# Patient Record
Sex: FEMALE | Race: WHITE | NOT HISPANIC OR LATINO | Employment: OTHER | URBAN - NONMETROPOLITAN AREA
[De-identification: names, ages, dates, MRNs, and addresses within clinical notes are randomized per-mention and may not be internally consistent; named-entity substitution may affect disease eponyms.]

---

## 2018-06-05 ENCOUNTER — OFFICE VISIT (OUTPATIENT)
Dept: FAMILY MEDICINE | Facility: OTHER | Age: 68
End: 2018-06-05
Attending: NURSE PRACTITIONER
Payer: COMMERCIAL

## 2018-06-05 VITALS — HEART RATE: 60 BPM | SYSTOLIC BLOOD PRESSURE: 122 MMHG | DIASTOLIC BLOOD PRESSURE: 80 MMHG | WEIGHT: 201.3 LBS
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
## 2018-06-05 PROBLEM — A69.20 LYME DISEASE: Status: ACTIVE | Noted: 2018-06-05

## 2018-06-05 PROCEDURE — 99213 OFFICE O/P EST LOW 20 MIN: CPT | Performed by: NURSE PRACTITIONER

## 2018-06-05 PROCEDURE — G0463 HOSPITAL OUTPT CLINIC VISIT: HCPCS

## 2018-06-05 RX ORDER — DOXYCYCLINE 100 MG/1
200 CAPSULE ORAL ONCE
Qty: 2 CAPSULE | Refills: 0 | Status: SHIPPED | OUTPATIENT
Start: 2018-06-05 | End: 2018-06-05

## 2018-06-05 ASSESSMENT — PAIN SCALES - GENERAL: PAINLEVEL: NO PAIN (0)

## 2018-06-05 ASSESSMENT — ANXIETY QUESTIONNAIRES
1. FEELING NERVOUS, ANXIOUS, OR ON EDGE: NOT AT ALL
2. NOT BEING ABLE TO STOP OR CONTROL WORRYING: NOT AT ALL

## 2018-06-05 NOTE — MR AVS SNAPSHOT
"              After Visit Summary   6/5/2018    Claudia Ballesteros    MRN: [id number]           Patient Information     Date Of Birth          1950        Visit Information        Provider Department      6/5/2018 9:30 AM Yanira Ortiz CNP [facility]        Today's Diagnoses     Tick bite, initial encounter    -  1      Care Instructions    ASSESSMENT/PLAN:     1. Tick bite, initial encounter  - doxycycline (VIBRAMYCIN) 100 MG capsule; Take 2 capsules (200 mg) by mouth once for 1 dose  Dispense: 2 capsule; Refill: 0  - Take both pills at the same time with food.   - If you notice any symptoms of Lyme disease as discussed return to the clinic for tick borne disease testing.                 Yanira Ortiz CNP  [facility]            Follow-ups after your visit        Who to contact     If you have questions or need follow up information about today's clinic visit or your schedule please contact [facility] directly at [phone].  Normal or non-critical lab and imaging results will be communicated to you by Scirrahart, letter or phone within 4 business days after the clinic has received the results. If you do not hear from us within 7 days, please contact the clinic through Scirrahart or phone. If you have a critical or abnormal lab result, we will notify you by phone as soon as possible.  Submit refill requests through NewHound or call your pharmacy and they will forward the refill request to us. Please allow 3 business days for your refill to be completed.          Additional Information About Your Visit        Scirrahart Information     NewHound lets you send messages to your doctor, view your test results, renew your prescriptions, schedule appointments and more. To sign up, go to [email or web address] . Click on \"Log in\" on the left side of the screen, which will take you to the Welcome page. Then click on \"Sign up Now\" on the right side of the " page.     You will be asked to enter the access code listed below, as well as some personal information. Please follow the directions to create your username and password.     Your access code is: [email or web address]  Expires: 9/3/2018  9:50 AM     Your access code will  in 90 days. If you need help or a new code, please call your [place] clinic or [phone].        Care EveryWhere ID     This is your Care EveryWhere ID. This could be used by other organizations to access your [place] medical records  [id number]        Your Vitals Were     Pulse                   60            Blood Pressure from Last 3 Encounters:   18 122/80    Weight from Last 3 Encounters:   18 201 lb 4.8 oz (91.3 kg)              Today, you had the following     No orders found for display         Today's Medication Changes          These changes are accurate as of 18  9:50 AM.  If you have any questions, ask your nurse or doctor.               Start taking these medicines.        Dose/Directions    doxycycline 100 MG capsule   Commonly known as:  VIBRAMYCIN   Used for:  Tick bite, initial encounter   Started by:  Yanira Ortiz CNP        Dose:  200 mg   Take 2 capsules (200 mg) by mouth once for 1 dose   Quantity:  2 capsule   Refills:  0            Where to get your medicines      These medications were sent to [facility] [address] [place], [place] - [address] AT [address]   [address], [facility] [address]     Phone:  [phone]     doxycycline 100 MG capsule                Primary Care Provider Fax #    Physician No Ref-Primary [phone]       No address on file        Equal Access to Services     JARED PAULINO : Hadii charo pantojao Sodayana, waaxda luqadaha, qaybta kaalmada marlyn, vignesh huff. So [facility] [phone].    ATENCIÓN: Si habla español, tiene a hernandez disposición servicios gratuitos de asistencia lingüística. Llame al [phone].    We  comply with applicable federal civil rights laws and [place] laws. We do not discriminate on the basis of race, color, national origin, age, disability, sex, sexual orientation, or gender identity.            Thank you!     Thank you for choosing [facility] AND [facility]  for your care. Our goal is always to provide you with excellent care. Hearing back from our patients is one way we can continue to improve our services. Please take a few minutes to complete the written survey that you may receive in the mail after your visit with us. Thank you!             Your Updated Medication List - Protect others around you: Learn how to safely use, store and throw away your medicines at www.disposemymeds.org.          This list is accurate as of 6/5/18  9:50 AM.  Always use your most recent med list.                   Brand Name Dispense Instructions for use Diagnosis    doxycycline 100 MG capsule    VIBRAMYCIN    2 capsule    Take 2 capsules (200 mg) by mouth once for 1 dose    Tick bite, initial encounter

## 2018-06-05 NOTE — PATIENT INSTRUCTIONS
ASSESSMENT/PLAN:     1. Tick bite, initial encounter  - doxycycline (VIBRAMYCIN) 100 MG capsule; Take 2 capsules (200 mg) by mouth once for 1 dose  Dispense: 2 capsule; Refill: 0  - Take both pills at the same time with food.   - If you notice any symptoms of Lyme disease as discussed return to the clinic for tick borne disease testing.                 Yanira Ortiz, [facility] AND [facility]

## 2018-06-05 NOTE — PROGRESS NOTES
SUBJECTIVE:   Claudia Ballesteros is a 67 year old female who presents to clinic today for the following health issues:    Tick Bite  Removed tick during night off of left buttock.  Brought tick in and it is consistent appearance of a female adult deer tick.  Denies symptoms of headache, arthralgia, myalgia, neuropathy, fatigue.  History of lyme disease--tested positive in CSF and had several neurologic symptoms about 5 years ago.  A little redness around tick bite, no other rashes.      Problem list and histories reviewed & adjusted, as indicated.  Additional history: as documented    There is no problem list on file for this patient.    No past surgical history on file.    Social History   Substance Use Topics     Smoking status: Never Smoker     Smokeless tobacco: Never Used     Alcohol use Yes     No family history on file.      Current Outpatient Prescriptions   Medication Sig Dispense Refill     doxycycline (VIBRAMYCIN) 100 MG capsule Take 2 capsules (200 mg) by mouth once for 1 dose 2 capsule 0     Allergies   Allergen Reactions     Naproxen Hives     No lab results found.   BP Readings from Last 3 Encounters:   06/05/18 122/80    Wt Readings from Last 3 Encounters:   06/05/18 201 lb 4.8 oz (91.3 kg)                    Reviewed and updated as needed this visit by clinical staff       Reviewed and updated as needed this visit by Provider         ROS:  Constitutional, HEENT, cardiovascular, pulmonary, gi and gu systems are negative, except as otherwise noted.    OBJECTIVE:     /80 (BP Location: Right arm, Patient Position: Sitting, Cuff Size: Adult Large)  Pulse 60  Wt 201 lb 4.8 oz (91.3 kg)      GENERAL: healthy, alert and no distress  MS: no gross musculoskeletal defects noted, no edema  SKIN: Localized erythema around tick bite on left buttock, no drainage or warmth  NEURO: Normal strength and tone, mentation intact and speech normal  PSYCH: mentation appears normal, affect  normal        ASSESSMENT/PLAN:     1. Tick bite, initial encounter  Discussed limitations of doing tick borne disease testing at this point. Within 72 hour limit for Lyme prophylaxis.   - doxycycline (VIBRAMYCIN) 100 MG capsule; Take 2 capsules (200 mg) by mouth once for 1 dose  Dispense: 2 capsule; Refill: 0  - Take both pills at the same time with food.   - If you notice any symptoms of Lyme disease as discussed return to the clinic for tick borne disease testing.                 Yanira Ortzi [facility] AND \[facility]\""

## 2018-06-05 NOTE — NURSING NOTE
Patient presents to the clinic for a tick bite. Patient states she noticed it last night. Patient will update medication list next time she comes in.  Sriram Bustamante ..............6/5/2018 9:35 AM

## 2018-08-05 ENCOUNTER — OFFICE VISIT (OUTPATIENT)
Dept: FAMILY MEDICINE | Facility: OTHER | Age: 68
End: 2018-08-05
Attending: NURSE PRACTITIONER
Payer: MEDICARE

## 2018-08-05 VITALS
BODY MASS INDEX: 32.62 KG/M2 | HEIGHT: 66 IN | DIASTOLIC BLOOD PRESSURE: 78 MMHG | WEIGHT: 203 LBS | TEMPERATURE: 97.5 F | RESPIRATION RATE: 18 BRPM | HEART RATE: 64 BPM | SYSTOLIC BLOOD PRESSURE: 130 MMHG

## 2018-08-05 DIAGNOSIS — T14.8XXA BRUISE: Primary | ICD-10-CM

## 2018-08-05 DIAGNOSIS — L98.9 SKIN LESION: ICD-10-CM

## 2018-08-05 LAB
BASOPHILS # BLD AUTO: 0.1 10E9/L (ref 0–0.2)
BASOPHILS NFR BLD AUTO: 0.9 %
DIFFERENTIAL METHOD BLD: NORMAL
EOSINOPHIL # BLD AUTO: 0.2 10E9/L (ref 0–0.7)
EOSINOPHIL NFR BLD AUTO: 2.5 %
ERYTHROCYTE [DISTWIDTH] IN BLOOD BY AUTOMATED COUNT: 14.9 % (ref 10–15)
HCT VFR BLD AUTO: 41.1 % (ref 35–47)
HGB BLD-MCNC: 13.7 G/DL (ref 11.7–15.7)
IMM GRANULOCYTES # BLD: 0 10E9/L (ref 0–0.4)
IMM GRANULOCYTES NFR BLD: 0.3 %
LYMPHOCYTES # BLD AUTO: 1.6 10E9/L (ref 0.8–5.3)
LYMPHOCYTES NFR BLD AUTO: 23.3 %
MCH RBC QN AUTO: 30.3 PG (ref 26.5–33)
MCHC RBC AUTO-ENTMCNC: 33.3 G/DL (ref 31.5–36.5)
MCV RBC AUTO: 91 FL (ref 78–100)
MONOCYTES # BLD AUTO: 0.6 10E9/L (ref 0–1.3)
MONOCYTES NFR BLD AUTO: 9.1 %
NEUTROPHILS # BLD AUTO: 4.4 10E9/L (ref 1.6–8.3)
NEUTROPHILS NFR BLD AUTO: 63.9 %
PLATELET # BLD AUTO: 279 10E9/L (ref 150–450)
RBC # BLD AUTO: 4.52 10E12/L (ref 3.8–5.2)
WBC # BLD AUTO: 6.9 10E9/L (ref 4–11)

## 2018-08-05 PROCEDURE — 99213 OFFICE O/P EST LOW 20 MIN: CPT | Performed by: NURSE PRACTITIONER

## 2018-08-05 PROCEDURE — 87798 DETECT AGENT NOS DNA AMP: CPT | Performed by: NURSE PRACTITIONER

## 2018-08-05 PROCEDURE — 85025 COMPLETE CBC W/AUTO DIFF WBC: CPT | Performed by: NURSE PRACTITIONER

## 2018-08-05 PROCEDURE — 86618 LYME DISEASE ANTIBODY: CPT | Performed by: NURSE PRACTITIONER

## 2018-08-05 PROCEDURE — 36415 COLL VENOUS BLD VENIPUNCTURE: CPT | Performed by: NURSE PRACTITIONER

## 2018-08-05 PROCEDURE — G0463 HOSPITAL OUTPT CLINIC VISIT: HCPCS

## 2018-08-05 RX ORDER — SIMVASTATIN 20 MG
TABLET ORAL
COMMUNITY
Start: 2018-05-16

## 2018-08-05 RX ORDER — INFLUENZA A VIRUS A/VICTORIA/4897/2022 IVR-238 (H1N1) ANTIGEN (FORMALDEHYDE INACTIVATED), INFLUENZA A VIRUS A/CALIFORNIA/122/2022 SAN-022 (H3N2) ANTIGEN (FORMALDEHYDE INACTIVATED), AND INFLUENZA B VIRUS B/MICHIGAN/01/2021 ANTIGEN (FORMALDEHYDE INACTIVATED) 60; 60; 60 UG/.5ML; UG/.5ML; UG/.5ML
INJECTION, SUSPENSION INTRAMUSCULAR
COMMUNITY
Start: 2017-10-27 | End: 2018-08-05

## 2018-08-05 RX ORDER — LEVOTHYROXINE SODIUM 137 UG/1
TABLET ORAL
COMMUNITY
Start: 2018-05-16 | End: 2024-09-10

## 2018-08-05 ASSESSMENT — PAIN SCALES - GENERAL: PAINLEVEL: NO PAIN (0)

## 2018-08-05 NOTE — MR AVS SNAPSHOT
After Visit Summary   8/5/2018    Claudia Ballesteros    MRN: [id number]           Patient Information     Date Of Birth          1950        Visit Information        Provider Department      8/5/2018 10:45 AM Jessi Agustin NP [facility]        Today's Diagnoses     Bruise    -  1    Skin lesion          Care Instructions      Bruises (Contusions)  A contusion is a bruise. A bruise happens when a blow to your body doesn't break the skin but does break blood vessels beneath the skin. Blood leaking from the broken vessels causes redness and swelling. As it heals, your bruise is likely to turn colors like purple, green, and yellow. This is normal. The bruise should fade in 2 or 3 weeks.  Factors that make you more likely to bruise  Almost everyone bruises now and then. Certain people do bruise more easily than others. You're more prone to bruising as you get older. That's because blood vessels become more fragile with age. You're also more likely to bruise if you have a clotting disorder such as hemophilia or take medicines that reduce clotting, including aspirin and coumadin.  When to go to the emergency room (ER)  Bruises almost always heal on their own without special treatment. But for some people, a bad bruise can be serious. Seek medical care if you:    Have a clotting disorder such as hemophilia    Have cirrhosis or other serious liver disease    Take blood-thinning medicines such as warfarin  What to expect in the ER  A doctor will examine your bruise and ask about any health conditions you have. In some cases, you may have a test to check how well your blood clots. Other treatment will depend on your needs.  Follow-up care  Sometimes a bruise gets worse instead of better. It may become larger and more swollen. This can occur when your body walls off a small pool of blood under the skin (hematoma). In very rare cases, your doctor may need to drain extra blood from the  "area.  Tip:  Apply an ice pack or bag of frozen peas to a bruise. Keep a thin cloth between the ice or frozen peas and your skin. The cold can help reduce redness and swelling.       I feel this is a bruise but tick testing will be done.                 Follow-ups after your visit        Who to contact     If you have questions or need follow up information about today's clinic visit or your schedule please contact [facility] AND [facility] directly at [phone].  Normal or non-critical lab and imaging results will be communicated to you by doohart, letter or phone within 4 business days after the clinic has received the results. If you do not hear from us within 7 days, please contact the clinic through BrandYourselft or phone. If you have a critical or abnormal lab result, we will notify you by phone as soon as possible.  Submit refill requests through Definiens or call your pharmacy and they will forward the refill request to us. Please allow 3 business days for your refill to be completed.          Additional Information About Your Visit        Definiens Information     Definiens lets you send messages to your doctor, view your test results, renew your prescriptions, schedule appointments and more. To sign up, go to [email or web address] . Click on \"Log in\" on the left side of the screen, which will take you to the Welcome page. Then click on \"Sign up Now\" on the right side of the page.     You will be asked to enter the access code listed below, as well as some personal information. Please follow the directions to create your username and password.     Your access code is: [email or web address]  Expires: 9/3/2018  9:50 AM     Your access code will  in 90 days. If you need help or a new code, please call your [place] clinic or [phone].        Care EveryWhere ID     This is your Care EveryWhere ID. This could be used by other organizations to access your [place] medical records  [id number]        Your Vitals " "Were     Pulse Temperature Respirations Height Breastfeeding? BMI (Body Mass Index)    64 97.5  F (36.4  C) (Tympanic) 18 5' 6\" (1.676 m) No 32.77 kg/m2       Blood Pressure from Last 3 Encounters:   08/05/18 130/78   06/05/18 122/80    Weight from Last 3 Encounters:   08/05/18 203 lb (92.1 kg)   06/05/18 201 lb 4.8 oz (91.3 kg)              We Performed the Following     CBC with platelets differential     Ehrlichia Anaplasma Sp by PCR     Lyme Disease Lizzy with reflex to WB Serum        Primary Care Provider Fax #    Physician No Ref-Primary [phone]       No address on file        Equal Access to Services     YOVANI PAULINO : Dwayne Redman, ana cristina dickson, kristofer cline, vignesh hugo . So [facility] [phone].    ATENCIÓN: Si habla español, tiene a hernandez disposición servicios gratuitos de asistencia lingüística. Llame al [phone].    We comply with applicable federal civil rights laws and [place] laws. We do not discriminate on the basis of race, color, national origin, age, disability, sex, sexual orientation, or gender identity.            Thank you!     Thank you for choosing [facility] AND [facility]  for your care. Our goal is always to provide you with excellent care. Hearing back from our patients is one way we can continue to improve our services. Please take a few minutes to complete the written survey that you may receive in the mail after your visit with us. Thank you!             Your Updated Medication List - Protect others around you: Learn how to safely use, store and throw away your medicines at www.disposemymeds.org.          This list is accurate as of 8/5/18 11:12 AM.  Always use your most recent med list.                   Brand Name Dispense Instructions for use Diagnosis    FLUZONE HIGH-DOSE 0.5 ML injection   Generic drug:  influenza Vac Split High-Dose           levothyroxine 137 MCG tablet    SYNTHROID/LEVOTHROID          " ranitidine 150 MG tablet    ZANTAC          sertraline 50 MG tablet    ZOLOFT          simvastatin 20 MG tablet    ZOCOR

## 2018-08-05 NOTE — PROGRESS NOTES
"Nursing Notes:   Promise Tomasa D., LPN  8/5/2018 10:37 AM  Unsigned  Patient presents to the clinic for possible tick bite. States she didn't see it, but now has a bullseye rash. States she is always outside gardening. Wants someone to tell her what kind of bite she has.   Tomasa Virgen LPN............. August 5, 2018 10:37 AM       SUBJECTIVE:   Claudia Ballesteros is a 68 year old female who presents to clinic today for the following health issues:    With the discoloration to the left upper thigh.  She states she was gardening over the last few days and noticed this lesion yesterday.  She denies fevers, chills, signs or symptoms of systemic illness.  No other rashes.  She has not used homecare therapies.  She feels that the lesion is mildly tender but there is no itching.  She has not pulled off a tick or seen any other bug bites recently.      Problem list and histories reviewed & adjusted, as indicated.  Additional history: as documented    Current Outpatient Prescriptions   Medication Sig Dispense Refill     levothyroxine (SYNTHROID/LEVOTHROID) 137 MCG tablet        ranitidine (ZANTAC) 150 MG tablet        sertraline (ZOLOFT) 50 MG tablet        simvastatin (ZOCOR) 20 MG tablet        Allergies   Allergen Reactions     Naproxen Hives         ROS:  Notable findings in the HPI.       OBJECTIVE:     /78 (BP Location: Right arm, Patient Position: Sitting, Cuff Size: Adult Regular)  Pulse 64  Temp 97.5  F (36.4  C) (Tympanic)  Resp 18  Ht 5' 6\" (1.676 m)  Wt 203 lb (92.1 kg)  Breastfeeding? No  BMI 32.77 kg/m2  Body mass index is 32.77 kg/(m^2).  GENERAL: healthy, alert and no distress  EYES: Eyes grossly normal to inspection  HENT: normal cephalic/atraumatic and oral mucous membranes moist  RESP: without increased work of breathing.   SKIN: LT upper thigh, inside area there is a irregular shaped lesion, consistent with a bruise. Mildly tender to palpation. Deep purple, to brown, yellow in " color.     Diagnostic Test Results:  Results for orders placed or performed in visit on 08/05/18 (from the past 24 hour(s))   CBC with platelets differential   Result Value Ref Range    WBC 6.9 4.0 - 11.0 10e9/L    RBC Count 4.52 3.8 - 5.2 10e12/L    Hemoglobin 13.7 11.7 - 15.7 g/dL    Hematocrit 41.1 35.0 - 47.0 %    MCV 91 78 - 100 fl    MCH 30.3 26.5 - 33.0 pg    MCHC 33.3 31.5 - 36.5 g/dL    RDW 14.9 10.0 - 15.0 %    Platelet Count 279 150 - 450 10e9/L    Diff Method Automated Method     % Neutrophils 63.9 %    % Lymphocytes 23.3 %    % Monocytes 9.1 %    % Eosinophils 2.5 %    % Basophils 0.9 %    % Immature Granulocytes 0.3 %    Absolute Neutrophil 4.4 1.6 - 8.3 10e9/L    Absolute Lymphocytes 1.6 0.8 - 5.3 10e9/L    Absolute Monocytes 0.6 0.0 - 1.3 10e9/L    Absolute Eosinophils 0.2 0.0 - 0.7 10e9/L    Absolute Basophils 0.1 0.0 - 0.2 10e9/L    Abs Immature Granulocytes 0.0 0 - 0.4 10e9/L       ASSESSMENT/PLAN:     1. Bruise    2. Skin lesion  - CBC with platelets differential  - Lyme Disease Lizzy with reflex to WB Serum  - Ehrlichia Anaplasma Sp by PCR    PLAN:    Rash:  Reassurance was given to the patient  Tylenol or Ibuprofen for pain, fever  Labs done for reassurance.  Explained to the patient that this appears to be a bruise.  She is unsure how she got a bruise in this area however through gardening and such it is possible that she just injured herself.  She is concerned that this is a Lyme's, erythematous migrans.  She has had Lyme disease in the past and wants to be sure that that is not what is going on today.  We will do labs and call her with results.    Followup:    If not improving or if condition worsens, follow up with your Primary Care Provider    Disclaimer:  This note consists of words and symbols derived from keyboarding, dictation, or using voice recognition software. As a result, there may be errors in the script that have gone undetected. Please consider this when interpreting information  found in this note.      Jessi Agustin NP, 8/5/2018 10:59 AM

## 2018-08-05 NOTE — NURSING NOTE
Patient presents to the clinic for possible tick bite. States she didn't see it, but now has a bullseye rash. States she is always outside gardening. Wants someone to tell her what kind of bite she has.   Tomasa Virgen LPN............. August 5, 2018 10:37 AM

## 2018-08-07 LAB — B BURGDOR IGG+IGM SER QL: 0.23 (ref 0–0.89)

## 2018-08-09 LAB
A PHAGOCYTOPH DNA BLD QL NAA+PROBE: NOT DETECTED
E CHAFFEENSIS DNA BLD QL NAA+PROBE: NOT DETECTED
E EWINGII DNA SPEC QL NAA+PROBE: NOT DETECTED
EHRLICHIA DNA SPEC QL NAA+PROBE: NOT DETECTED

## 2021-04-17 ENCOUNTER — OFFICE VISIT (OUTPATIENT)
Dept: FAMILY MEDICINE | Facility: OTHER | Age: 71
End: 2021-04-17
Attending: NURSE PRACTITIONER
Payer: COMMERCIAL

## 2021-04-17 VITALS
DIASTOLIC BLOOD PRESSURE: 70 MMHG | HEIGHT: 66 IN | SYSTOLIC BLOOD PRESSURE: 118 MMHG | HEART RATE: 72 BPM | RESPIRATION RATE: 16 BRPM | TEMPERATURE: 97.3 F | BODY MASS INDEX: 33.8 KG/M2 | WEIGHT: 210.3 LBS

## 2021-04-17 DIAGNOSIS — S91.331A NAIL WOUND OF RIGHT FOOT, INITIAL ENCOUNTER: Primary | ICD-10-CM

## 2021-04-17 PROCEDURE — G0463 HOSPITAL OUTPT CLINIC VISIT: HCPCS

## 2021-04-17 PROCEDURE — 99213 OFFICE O/P EST LOW 20 MIN: CPT | Performed by: NURSE PRACTITIONER

## 2021-04-17 RX ORDER — CEPHALEXIN 500 MG/1
500 CAPSULE ORAL 3 TIMES DAILY
Qty: 21 CAPSULE | Refills: 0 | Status: SHIPPED | OUTPATIENT
Start: 2021-04-17 | End: 2021-04-24

## 2021-04-17 ASSESSMENT — PAIN SCALES - GENERAL: PAINLEVEL: MODERATE PAIN (5)

## 2021-04-17 ASSESSMENT — MIFFLIN-ST. JEOR: SCORE: 1480.66

## 2021-04-17 NOTE — NURSING NOTE
Patient presents to the clinic today after stepping on a nail 4/16/21. She states she thinks it may be infected.   Med rec complete.  Rosana Fletcher LPN.................. 4/17/2021 10:53 AM

## 2021-04-17 NOTE — PROGRESS NOTES
"HPI:    Claudia Ballesteros is a 70 year old female  who presents to [facility] today for stepped on nail    She stepped on an old nail on an old piece of wood outside yesterday while walking her dog. The nail went through her shoe and into her heel of her right foot.  Very tender to weight bear and ambulate.  She noted some redness this morning and is concerned about possible infection.  She states her  is a retired general MD and states she should be seen and get some antibiotics.  Pain lessens after soaking in warm water with soap.  No fevers or chills.  No numbness or tingling in her right foot.    Her last tetanus was 4/26/2019.    She is not taking anything for pain.      No past medical history on file.  No past surgical history on file.  Social History     Tobacco Use     Smoking status: Never Smoker     Smokeless tobacco: Never Used   Substance Use Topics     Alcohol use: Yes     Current Outpatient Medications   Medication Sig Dispense Refill     levothyroxine (SYNTHROID/LEVOTHROID) 137 MCG tablet        sertraline (ZOLOFT) 50 MG tablet        simvastatin (ZOCOR) 20 MG tablet        omeprazole (PRILOSEC) 20 MG DR capsule Take 20 mg by mouth daily       Allergies   Allergen Reactions     Naproxen Hives         Past medical history, past surgical history, current medications and allergies reviewed and accurate to the best of my knowledge.        ROS:  Refer to HPI    /70   Pulse 72   Temp 97.3  F (36.3  C) (Tympanic)   Resp 16   Ht 1.676 m (5' 6\")   Wt 95.4 kg (210 lb 4.8 oz)   Breastfeeding No   BMI 33.94 kg/m      EXAM:  General Appearance: Well appearing young elderly female, appropriate appearance for age. No acute distress  Respiratory: normal chest wall and respirations.  Normal effort.  No cough appreciated.  Cardiovascular:  CMS intact to right lower extremity, no lower extremity edema  Musculoskeletal:  Equal movement of bilateral upper extremities.  Equal movement of bilateral lower " extremities.  Normal gait.    Dermatological: right plantar foot with single small puncture wound without visible depth, mild surrounding erythema over plantar heel with associated mild tenderness, no drainage or bleeding  Psychological: normal affect, alert, oriented, and pleasant.           ASSESSMENT/PLAN:    I have reviewed the nursing notes.  I have reviewed the findings, diagnosis, plan and need for follow up with the patient.    1. Nail wound of right foot, initial encounter    - cephALEXin (KEFLEX) 500 MG capsule; Take 1 capsule (500 mg) by mouth 3 times daily for 7 days  Dispense: 21 capsule; Refill: 0    Tetanus up to date, last 4/26/19    Continue to soak frequently in warm water with either soap or epsom salt    May use over-the-counter Tylenol BID PRN    Discussed warning signs/symptoms indicative of need to f/u  Follow up if symptoms persist or worsen or concerns      I explained my diagnostic considerations and recommendations to the patient, who voiced understanding and agreement with the treatment plan. All questions were answered. We discussed potential side effects of any prescribed or recommended therapies, as well as expectations for response to treatments.

## 2021-05-12 ENCOUNTER — PATIENT OUTREACH (OUTPATIENT)
Dept: FAMILY MEDICINE | Facility: OTHER | Age: 71
End: 2021-05-12

## 2021-05-12 NOTE — LETTER
May 12, 2021      Claudia Ballesteros  [address] [facility] [address]      Your healthcare team cares about your health. To provide you with the best care,   we have reviewed your chart and based on our findings, we see that you are due to:     - BREAST CANCER SCREENING:  Please call to Schedule Annual Mammogram at [phone].    - COLON CANCER SCREENING:  Call the clinic to schedule your colonoscopy or Cologuard test.  - ANNUAL WELLNESS FOLLOW UP:   Schedule an Annual Medicare Wellness Exam. This can be done by in person visit or virtual video visit.     If you have already completed these items, please contact the clinic via phone or   BugBusterhart so your care team can review and update your records. Thank you for   choosing [facility] for your healthcare needs. For any questions,   concerns, or to schedule an appointment please contact the clinic.       Healthy Regards,      Your [facility] Care Team

## 2021-05-12 NOTE — TELEPHONE ENCOUNTER
Patient Quality Outreach      Summary:    Patient has the following on her problem list/HM:     Immunizations       Health Maintenance Due   Topic     Diptheria Tetanus Pertussis (DTAP/TDAP/TD) Vaccine (1 - Tdap)         Patient is due/failing the following:   Colonoscopy, Breast Cancer Screening - Mammogram, Annual wellness, date due: 6/12/21 and Immunizations    Type of outreach:    Sent letter.    Questions for provider review:    None                                                                                                                                     Germaine Harry NP ..................5/12/2021 10:12 AM         Chart routed to .

## 2022-05-06 DIAGNOSIS — R26.89 IMBALANCE: Primary | ICD-10-CM

## 2022-06-01 ENCOUNTER — HOSPITAL ENCOUNTER (OUTPATIENT)
Dept: PHYSICAL THERAPY | Facility: OTHER | Age: 72
Setting detail: THERAPIES SERIES
Discharge: HOME | End: 2022-06-01
Attending: PEDIATRICS
Payer: MEDICARE

## 2022-06-01 DIAGNOSIS — R26.89 IMBALANCE: ICD-10-CM

## 2022-06-01 PROCEDURE — 97161 PT EVAL LOW COMPLEX 20 MIN: CPT | Mod: GP

## 2022-06-01 PROCEDURE — 97110 THERAPEUTIC EXERCISES: CPT | Mod: GP

## 2022-06-01 NOTE — PROGRESS NOTES
06/01/22 1300   Quick Adds   Quick Adds Certification   Type of Visit Initial OP PT Evaluation   General Information   Start of Care Date 06/01/22   Referring Physician Dr. 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   General Information Comments Pt is a 71 year old female referred to skilled PT services following an increase in instability during gait and falls over the last few months due to LOB. pt does not feel like anything specific has changed, just feels as though she is getting older and has gained some weight. Does not feel vertigo just more a sense of instability. Pt has started to use trekking poles when walking outdoors and generalized weakness impacting gait and transfers.   Fall Risk Screen   Fall screen completed by PT   Have you fallen 2 or more times in the past year? Yes   Have you fallen and had an injury in the past year? No   Is patient a fall risk? Yes;Department fall risk interventions implemented   Fall screen comments pt has scored an increased falls risk on the FGA   Abuse Screen (yes response referral indicated)   Feels Unsafe at Home or Work/School no   Feels Threatened by Someone no   Does Anyone Try to Keep You From Having Contact with Others or Doing Things Outside  Your Home? no   Physical Signs of Abuse Present no   Pain   Patient currently in pain No   Cognitive Status Examination   Orientation orientation to person, place and time   Observation   Observation B knee genu valgum   Posture   Posture Protracted shoulders   Range of Motion (ROM)   ROM Comment EDGARDO's WFL   Strength   Strength Comments B hip flexion 4-/5, B hip ADD 4/5, B hip ABD 4+/5, B knee flexion/ext 4+/5, B ankle dorsiflexion 4/5   Transfer Skills   Transfer Comments pt able to stand from chair without UE assist   Gait   Gait Comments B knee genu valgum, increased lateral sway, B foot clearance but lateral lean required to clear feet.   Gait Special Tests   Gait Special Tests FUNCTIONAL GAIT ASSESSMENT   Gait Special Tests Functional Gait Assessment Score out of 30   Score out of 30 22   Comments difficulty with eyes closed, head motion, and tandem gait   Balance Special Tests Pineda Balance   Score out of 56 48   Comments difficulty with SLS, toe tapping, and tandem stance   Sensory Examination   Sensory Perception Comments negative B kip hallgricel   Modality Interventions   Planned Modality Interventions TENS;Ultrasound;Cryotherapy;Thermotherapy: Hydrocollator Packs   Planned Therapy Interventions   Planned Therapy Interventions balance training;gait training;joint mobilization;motor coordination training;neuromuscular re-education;ROM;strengthening;stretching;transfer training;manual therapy;visual perception   Clinical Impression   Criteria for Skilled Therapeutic Interventions Met yes, treatment indicated   PT Diagnosis Gait instability, generalized weakness   Influenced by the following impairments weakness, decreased coordination/motor control, fatigue, decreased reaction time   Functional limitations due to impairments gait, stairs, transfers, walking outdoors, gardening, training dogs, hiking   Clinical Presentation Stable/Uncomplicated   Clinical Presentation Rationale symptoms consistent with age  related changes and balance decline   Clinical Decision Making (Complexity) Low complexity   Therapy Frequency 2 times/Week   Predicted Duration of Therapy Intervention (days/wks) 8 weeks   Risk & Benefits of therapy have been explained Yes   Patient, Family & other staff in agreement with plan of care Yes   Clinical Impression Comments Pt is a 71 year old female referred to skilled PT services following an increase in chronic slow progressing balance/instability. Pt reports increased instability while walking outdoors requiring trekking poles, decreased endurance, and increased UE assist on stairs. pt can benefit from skilled PT services to address these deficits in order to increase stability when walking outdoors and training dogs.   Education Assessment   Preferred Learning Style Listening;Reading   Barriers to Learning No barriers   GOALS   PT Eval Goals 1;2;3   Goal 1   Goal Identifier dynamic balance   Goal Description Pt will ambulate for 100 feet on even surface with intermittent horizontal and vertical head motion at normal gait speed without LOB or change in gait speed to increase stability for outdoor walking   Target Date 07/13/22   Goal 2   Goal Identifier static balance   Goal Description Pt will complete B SLS without UE assist for 5 sec B to increase stability for gait mechanics.   Target Date 06/29/22   Goal 3   Goal Identifier Gait   Goal Description Pt will ambulate outdoors at therapy facility for 200 feet on uneven sidewalk, parking lot, and hills without an assistive device and no LOB to increase stability for walking on trails   Target Date 07/27/22   Total Evaluation Time   PT Eval, Low Complexity Minutes (07249) 30   Therapy Certification   Certification date from 06/01/22   Certification date to 07/27/22   Medical Diagnosis Imbalance   Certification I certify the need for these services furnished under this plan of treatment and while under my care.  (Physician co-signature of this  document indicates review and certification of the therapy plan).

## 2022-06-01 NOTE — PROGRESS NOTES
[facility]                                                                                   OUTPATIENT PHYSICAL THERAPY FUNCTIONAL EVALUATION  PLAN OF TREATMENT FOR OUTPATIENT REHABILITATION  (COMPLETE FOR INITIAL CLAIMS ONLY)  Patient's Last Name, First Name, M.I.  YOB: 1950  Claudia Ballesteros     Provider's Name   [facility]   Medical Record No.  [id number]     Start of Care Date:  06/01/22   Onset Date:      Type:     _X__PT   ____OT  ____SLP Medical Diagnosis:  Imbalance     PT Diagnosis:  Gait instability, generalized weakness Visits from SOC:  1                              __________________________________________________________________________________  Plan of Treatment/Functional Goals:  balance training, gait training, joint mobilization, motor coordination training, neuromuscular re-education, ROM, strengthening, stretching, transfer training, manual therapy, visual perception     TENS, Ultrasound, Cryotherapy, Thermotherapy: Hydrocollator Packs     GOALS  dynamic balance  Pt will ambulate for 100 feet on even surface with intermittent horizontal and vertical head motion at normal gait speed without LOB or change in gait speed to increase stability for outdoor walking  07/13/22    static balance  Pt will complete B SLS without UE assist for 5 sec B to increase stability for gait mechanics.  06/29/22    Gait  Pt will ambulate outdoors at therapy facility for 200 feet on uneven sidewalk, parking lot, and hills without an assistive device and no LOB to increase stability for walking on trails  07/27/22       Therapy Frequency:  2 times/Week   Predicted Duration of Therapy Intervention:  8 weeks    Danielle Sutherland, PT                                    I CERTIFY THE NEED FOR THESE SERVICES FURNISHED UNDER        THIS PLAN OF TREATMENT AND WHILE UNDER MY  CARE .             Physician Signature               Date    X_____________________________________________________                  Certification Date From:  06/01/22   Certification Date To:  07/27/22    Referring Provider:  Dr. Lindsay    Initial Assessment  See Epic Evaluation- Start of Care Date: 06/01/22

## 2022-06-03 ENCOUNTER — HOSPITAL ENCOUNTER (OUTPATIENT)
Dept: PHYSICAL THERAPY | Facility: OTHER | Age: 72
Setting detail: THERAPIES SERIES
Discharge: HOME | End: 2022-06-03
Attending: PEDIATRICS
Payer: MEDICARE

## 2022-06-03 PROCEDURE — 97110 THERAPEUTIC EXERCISES: CPT | Mod: GP

## 2022-06-03 PROCEDURE — 97112 NEUROMUSCULAR REEDUCATION: CPT | Mod: GP

## 2022-07-06 ENCOUNTER — HOSPITAL ENCOUNTER (OUTPATIENT)
Dept: PHYSICAL THERAPY | Facility: OTHER | Age: 72
Setting detail: THERAPIES SERIES
Discharge: HOME | End: 2022-07-06
Attending: PEDIATRICS
Payer: MEDICARE

## 2022-07-06 PROCEDURE — 97110 THERAPEUTIC EXERCISES: CPT | Mod: GP

## 2022-07-06 PROCEDURE — 97016 VASOPNEUMATIC DEVICE THERAPY: CPT | Mod: GP

## 2022-07-13 ENCOUNTER — HOSPITAL ENCOUNTER (OUTPATIENT)
Dept: PHYSICAL THERAPY | Facility: OTHER | Age: 72
Setting detail: THERAPIES SERIES
Discharge: HOME | End: 2022-07-13
Attending: PEDIATRICS
Payer: MEDICARE

## 2022-07-13 PROCEDURE — 97016 VASOPNEUMATIC DEVICE THERAPY: CPT | Mod: GP

## 2022-07-13 PROCEDURE — 97110 THERAPEUTIC EXERCISES: CPT | Mod: GP

## 2022-07-13 PROCEDURE — 97112 NEUROMUSCULAR REEDUCATION: CPT | Mod: GP

## 2022-07-27 ENCOUNTER — HOSPITAL ENCOUNTER (OUTPATIENT)
Dept: PHYSICAL THERAPY | Facility: OTHER | Age: 72
Setting detail: THERAPIES SERIES
Discharge: HOME | End: 2022-07-27
Attending: PEDIATRICS
Payer: MEDICARE

## 2022-07-27 PROCEDURE — 97016 VASOPNEUMATIC DEVICE THERAPY: CPT | Mod: GP

## 2022-07-27 PROCEDURE — 97110 THERAPEUTIC EXERCISES: CPT | Mod: GP

## 2022-07-27 PROCEDURE — 97112 NEUROMUSCULAR REEDUCATION: CPT | Mod: GP

## 2022-09-28 NOTE — PROGRESS NOTES
[facility]    Outpatient Physical Therapy Discharge Note  Patient: Claudia Ballesteros  : 1950    Beginning/End Dates of Reporting Period:  22 to 22    Referring Provider: Dr. Camara    Therapy Diagnosis: Gait instability, generalized weakness     Client Self Report: pt reports knees are better today, still has pain that comes and goes but is able to manage the pain with tylenol and ice.    Goals:  Goal Identifier dynamic balance   Goal Description Pt will ambulate for 100 feet on even surface with intermittent horizontal and vertical head motion at normal gait speed without LOB or change in gait speed to increase stability for outdoor walking   Target Date 22   Date Met  22   Progress (detail required for progress note): MET, pt able to complete with minimal difficulty     Goal Identifier static balance   Goal Description Pt will complete B SLS without UE assist for 5 sec B to increase stability for gait mechanics.   Target Date 22   Date Met  22   Progress (detail required for progress note): MET     Goal Identifier Gait   Goal Description Pt will ambulate outdoors at therapy facility for 200 feet on uneven sidewalk, parking lot, and hills without an assistive device and no LOB to increase stability for walking on trails   Target Date 22   Date Met      Progress (detail required for progress note): Progressing, pt reporting no issues with walking outdoors.     Plan:  Discharge from therapy.    Discharge:    Reason for Discharge: No further expectation of progress.    Equipment Issued: HEP      Discharge Plan: Patient to continue home program.

## 2022-10-17 ENCOUNTER — TRANSFERRED RECORDS (OUTPATIENT)
Dept: MULTI SPECIALTY CLINIC | Facility: CLINIC | Age: 72
End: 2022-10-17

## 2023-09-01 ENCOUNTER — THERAPY VISIT (OUTPATIENT)
Dept: PHYSICAL THERAPY | Facility: OTHER | Age: 73
End: 2023-09-01
Attending: PEDIATRICS
Payer: COMMERCIAL

## 2023-09-01 DIAGNOSIS — G89.29 CHRONIC PAIN OF RIGHT KNEE: ICD-10-CM

## 2023-09-01 DIAGNOSIS — M25.561 CHRONIC PAIN OF RIGHT KNEE: ICD-10-CM

## 2023-09-01 PROCEDURE — 97110 THERAPEUTIC EXERCISES: CPT | Mod: GP

## 2023-09-01 PROCEDURE — 97161 PT EVAL LOW COMPLEX 20 MIN: CPT | Mod: GP

## 2023-09-01 PROCEDURE — 97016 VASOPNEUMATIC DEVICE THERAPY: CPT | Mod: GP

## 2023-09-01 NOTE — PROGRESS NOTES
PHYSICAL THERAPY EVALUATION  Type of Visit: Evaluation    See electronic medical record for Abuse and Falls Screening details.    Subjective       Presenting condition or subjective complaint: Pt is a 73 year old female referred to skilled PT services following an increase in R knee pain that started with PT for balance last year. Pt reports knee pain has come and gone but continues to keep getting worse and needs to use a SEC due to pain causing pt to feel that her knee is going to buckle. Pt reports knee will swell and so will her lower leg and if she bumps her foot into a twisting motion causing torsion in her knee. pt reports knee pain is over the whole knee but radiates mostly down from the medial knee down to her ankle  Date of onset: 08/01/22    Relevant medical history: Arthritis     Prior therapy history for the same diagnosis, illness or injury: Yes      Prior Level of Function  Transfers: Independent  Ambulation: Independent  ADL: Independent  IADL: Driving, Finances, Housekeeping, Laundry, Meal preparation, Medication management, Work, Yard work    Living Environment  Social support: With a significant other or spouse   Type of home: House   Stairs to enter the home: No   Is there a railing: No   Ramp: No   Stairs inside the home: Yes 10 Is there a railing: Yes   Help at home: None  Equipment owned:       Employment: Yes potter  Hobbies/Interests: dog training, potter, walking    Patient goals for therapy: walking, training her dog, stairs    Pain assessment: See objective evaluation for additional pain details     Objective   KNEE EVALUATION  PAIN: Pain Level at Rest: 3/10  Pain Level with Use: 9/10  Pain Location: knee  Pain Quality: Aching, Dull, Sharp, and Stabbing  Pain is Exacerbated By: sitting, certain positions,   Pain is Relieved By: cold, otc medications, and rest  INTEGUMENTARY (edema, incisions):  swelling around knee joint, increased patellar motion  POSTURE:  rounded  shoulders  GAIT:  Weightbearing Status: WBAT  Assistive Device(s): None  Gait Deviations: Antalgic  Base of support increased  Stride length decreased  ROM:  R knee flexion 115 deg, knee ext lack of 13 deg  STRENGTH:  L LE grossly 4/5, r hip ABD/ADD 4-5, R hip flexion 3+/5, R knee flexion/ext 4/5  SPECIAL TESTS:    Left Right        Jonathon's (Meniscus) Negative  Negative    Tonny's (ITB/TFL) Negative  Positive   Patellar Apprehension Test Negative  Negative    Patella Tracking  Increased motion due to swelling   Ligamentous Stability Negative  Negative    Anterior Drawer (ACL) Negative,   Negative,     Posterior Drawer (PCL) Negative,   Negative,     Prone Dial Test at 30 Deg and 90 Deg (PCL/PLC) Negative,   Negative,     Valgus Stress Testing at 0 Deg and 30 Deg Negative,   Negative,     Varus Stress Testing at 0 Deg and 30 Deg  Negative,   Negative,       FUNCTIONAL TESTS: scour test, FABIR, FADER all negative on the R  PALPATION:  pain and high tissue tension at distal quad, IT band and proximal IT band, tight hamstrings but no pain  JOINT MOBILITY:  increased patellar motion    Assessment & Plan   CLINICAL IMPRESSIONS  Medical Diagnosis: Chronic R knee pain    Treatment Diagnosis: R knee pain, R hip weakness   Impression/Assessment: Patient is a 73 year old female with pain, weakness, and R knee instability complaints.  The following significant findings have been identified: Pain, Decreased ROM/flexibility, Decreased strength, Inflammation, Edema, and Impaired gait. These impairments interfere with their ability to perform self care tasks, work tasks, recreational activities, household chores, household mobility, and community mobility as compared to previous level of function.     Clinical Decision Making (Complexity):  Clinical Presentation: Stable/Uncomplicated  Clinical Presentation Rationale: based on medical and personal factors listed in PT evaluation  Clinical Decision Making (Complexity): Low  complexity    PLAN OF CARE  Treatment Interventions:  Modalities: Cryotherapy, E-stim, Ultrasound, Vasoneumatic Device phonophoresis with ketoprofen 10%  Interventions: Gait Training, Manual Therapy, Neuromuscular Re-education, Therapeutic Activity, Therapeutic Exercise, Aquatic Therapy    Long Term Goals     PT Goal 1  Goal Identifier: pain  Goal Description: Pt will report a 3/10 pain or less in her R knee to increase tolerance to walking and making pottery on her spinning wheel  Target Date: 10/13/23  PT Goal 2  Goal Identifier: strength  Goal Description: Pt will demonstrate 4/5 gross R hip ABD to increase at her knee during gait to increase tolerance to walking outdoors.  Target Date: 10/27/23  PT Goal 3  Goal Identifier: ROM  Goal Description: Pt will demonstrate 120 deg R knee flexion to increase ROM needed for stairs and walking.  Target Date: 10/27/23      Frequency of Treatment: 2x/week  Duration of Treatment: 8 weeks      Education Assessment:   Learner/Method: Patient    Risks and benefits of evaluation/treatment have been explained.   Patient/Family/caregiver agrees with Plan of Care.     Evaluation Time:     PT Eval, Low Complexity Minutes (11763): 15     Signing Clinician: Danielle Sutherland DPT      [facility]                                                                                   OUTPATIENT PHYSICAL THERAPY      PLAN OF TREATMENT FOR OUTPATIENT REHABILITATION   Patient's Last Name, First Name, Claudia Bosch YOB: 1950   Provider's Name   [facility]   Medical Record No.  [id number]     Onset Date: 08/01/22  Start of Care Date: 09/01/23     Medical Diagnosis:  Chronic R knee pain      PT Treatment Diagnosis:  R knee pain, R hip weakness Plan of Treatment  Frequency/Duration: 2x/week/ 8 weeks    Certification date from 09/01/23 to 10/27/23         See note for plan of treatment details and functional goals      Danielle Sutherland DPT                         I CERTIFY THE NEED FOR THESE SERVICES FURNISHED UNDER        THIS PLAN OF TREATMENT AND WHILE UNDER MY CARE .             Physician Signature               Date    X_____________________________________________________                    Referring Provider:  Leatha Lindsay      Initial Assessment  See Epic Evaluation- Start of Care Date: 09/01/23

## 2023-09-06 ENCOUNTER — THERAPY VISIT (OUTPATIENT)
Dept: PHYSICAL THERAPY | Facility: OTHER | Age: 73
End: 2023-09-06
Attending: PEDIATRICS
Payer: COMMERCIAL

## 2023-09-06 DIAGNOSIS — G89.29 CHRONIC PAIN OF RIGHT KNEE: Primary | ICD-10-CM

## 2023-09-06 DIAGNOSIS — M25.561 CHRONIC PAIN OF RIGHT KNEE: Primary | ICD-10-CM

## 2023-09-06 PROCEDURE — 97140 MANUAL THERAPY 1/> REGIONS: CPT | Mod: GP

## 2023-09-06 PROCEDURE — 97035 APP MDLTY 1+ULTRASOUND EA 15: CPT | Mod: GP

## 2023-09-07 ENCOUNTER — OFFICE VISIT (OUTPATIENT)
Dept: FAMILY MEDICINE | Facility: OTHER | Age: 73
End: 2023-09-07
Attending: NURSE PRACTITIONER
Payer: COMMERCIAL

## 2023-09-07 VITALS
RESPIRATION RATE: 16 BRPM | TEMPERATURE: 97.7 F | DIASTOLIC BLOOD PRESSURE: 72 MMHG | BODY MASS INDEX: 33.81 KG/M2 | SYSTOLIC BLOOD PRESSURE: 120 MMHG | OXYGEN SATURATION: 98 % | WEIGHT: 209.5 LBS | HEART RATE: 62 BPM

## 2023-09-07 DIAGNOSIS — L03.115 CELLULITIS OF RIGHT LOWER EXTREMITY: Primary | ICD-10-CM

## 2023-09-07 PROCEDURE — 99213 OFFICE O/P EST LOW 20 MIN: CPT | Performed by: NURSE PRACTITIONER

## 2023-09-07 PROCEDURE — G0463 HOSPITAL OUTPT CLINIC VISIT: HCPCS

## 2023-09-07 RX ORDER — CEPHALEXIN 500 MG/1
500 CAPSULE ORAL 4 TIMES DAILY
Qty: 28 CAPSULE | Refills: 0 | Status: SHIPPED | OUTPATIENT
Start: 2023-09-07 | End: 2023-09-14

## 2023-09-07 RX ORDER — LEVOTHYROXINE SODIUM 150 UG/1
150 TABLET ORAL DAILY
COMMUNITY
Start: 2023-08-14

## 2023-09-07 ASSESSMENT — PAIN SCALES - GENERAL: PAINLEVEL: MILD PAIN (3)

## 2023-09-07 NOTE — NURSING NOTE
Patient presents to clinic for concerns of puncture wound that happened 3 days ago  /72   Pulse 62   Temp 97.7  F (36.5  C) (Tympanic)   Resp 16   Wt 95 kg (209 lb 8 oz)   SpO2 98%   BMI 33.81 kg/m    Deirdre Keller LPN on 9/7/2023 at 11:11 AM

## 2023-09-07 NOTE — PROGRESS NOTES
ASSESSMENT/PLAN:    I have reviewed the nursing notes.  I have reviewed the findings, diagnosis, plan and need for follow up with the patient.    1. Cellulitis of right lower extremity  - cephALEXin (KEFLEX) 500 MG capsule; Take 1 capsule (500 mg) by mouth 4 times daily for 7 days  Dispense: 28 capsule; Refill: 0  Tdap last in 2019; UTD. Recommend follow up if no resolution or improvement following treatment, sooner if she develops a fever or worsening condition.     Discussed warning signs/symptoms indicative of need to f/u    Follow up if symptoms persist or worsen or concerns    I explained my diagnostic considerations and recommendations to the patient, who voiced understanding and agreement with the treatment plan. All questions were answered. We discussed potential side effects of any prescribed or recommended therapies, as well as expectations for response to treatments.    Pau Paz NP  9/7/2023  11:26 AM    HPI:  Claudia Ballesteros is a 73 year old female who [facility] for concerns of puncture wound that happened 3 days ago. She was gardening with a hand tool she had for 30 years and the metal point somehow went ghada her right lateral shin about 1-2 cm in depth likely.     The surrounding area is red, warm, tender, and swollen for past 2 days or so. No fever. Does not feel sick.     Not diabetic. No common history of cellulitis or skin infections.     Lives here but PCP is in [place].     No known CKD.     TDAP in 2019     ROS otherwise negative.    No past medical history on file.  No past surgical history on file.  Social History     Tobacco Use    Smoking status: Never    Smokeless tobacco: Never   Substance Use Topics    Alcohol use: Yes     Current Outpatient Medications   Medication Sig Dispense Refill    cephALEXin (KEFLEX) 500 MG capsule Take 1 capsule (500 mg) by mouth 4 times daily for 7 days 28 capsule 0    levothyroxine (SYNTHROID/LEVOTHROID) 137 MCG tablet        levothyroxine (SYNTHROID/LEVOTHROID) 150 MCG tablet Take 150 mcg by mouth daily      omeprazole (PRILOSEC) 20 MG DR capsule Take 20 mg by mouth daily      sertraline (ZOLOFT) 50 MG tablet       simvastatin (ZOCOR) 20 MG tablet        Allergies   Allergen Reactions    Naproxen Hives     Past medical history, past surgical history, current medications and allergies reviewed and accurate to the best of my knowledge.      ROS:  Refer to HPI    /72   Pulse 62   Temp 97.7  F (36.5  C) (Tympanic)   Resp 16   Wt 95 kg (209 lb 8 oz)   SpO2 98%   BMI 33.81 kg/m      EXAM:  General Appearance: Well appearing 73 year old male, appropriate appearance for age. No acute distress   Respiratory: normal chest wall and respirations.  Normal effort.  Clear to auscultation bilaterally, no wheezing, crackles or rhonchi.  No increased work of breathing.  No cough appreciated.  Cardiac: RRR with no murmurs  Musculoskeletal:  Equal movement of bilateral upper extremities.  Equal movement of bilateral lower extremities.  Normal gait.    Dermatological: + right lower extremity: lateral aspect of right lower extremity (shin) there is an area of cellulitis. Scabbed wound without drainage and surrounding erythema, warmth, swelling, and tenderness approximately 4-5 cm in diameter.   Neuro: Alert and oriented to person, place, and time.    Psychological: normal affect, alert, oriented, and pleasant.

## 2023-09-14 ENCOUNTER — OFFICE VISIT (OUTPATIENT)
Dept: FAMILY MEDICINE | Facility: OTHER | Age: 73
End: 2023-09-14
Payer: COMMERCIAL

## 2023-09-14 ENCOUNTER — THERAPY VISIT (OUTPATIENT)
Dept: PHYSICAL THERAPY | Facility: OTHER | Age: 73
End: 2023-09-14
Attending: PEDIATRICS
Payer: COMMERCIAL

## 2023-09-14 VITALS
WEIGHT: 211.7 LBS | SYSTOLIC BLOOD PRESSURE: 169 MMHG | BODY MASS INDEX: 34.02 KG/M2 | DIASTOLIC BLOOD PRESSURE: 84 MMHG | OXYGEN SATURATION: 98 % | RESPIRATION RATE: 16 BRPM | TEMPERATURE: 97.8 F | HEART RATE: 65 BPM | HEIGHT: 66 IN

## 2023-09-14 DIAGNOSIS — G89.29 CHRONIC PAIN OF RIGHT KNEE: Primary | ICD-10-CM

## 2023-09-14 DIAGNOSIS — B99.9 INFECTION: Primary | ICD-10-CM

## 2023-09-14 DIAGNOSIS — M25.561 CHRONIC PAIN OF RIGHT KNEE: Primary | ICD-10-CM

## 2023-09-14 PROCEDURE — G0463 HOSPITAL OUTPT CLINIC VISIT: HCPCS

## 2023-09-14 PROCEDURE — 97035 APP MDLTY 1+ULTRASOUND EA 15: CPT | Mod: GP

## 2023-09-14 PROCEDURE — 99213 OFFICE O/P EST LOW 20 MIN: CPT | Performed by: STUDENT IN AN ORGANIZED HEALTH CARE EDUCATION/TRAINING PROGRAM

## 2023-09-14 PROCEDURE — 87070 CULTURE OTHR SPECIMN AEROBIC: CPT | Mod: ZL | Performed by: STUDENT IN AN ORGANIZED HEALTH CARE EDUCATION/TRAINING PROGRAM

## 2023-09-14 PROCEDURE — 97110 THERAPEUTIC EXERCISES: CPT | Mod: GP

## 2023-09-14 PROCEDURE — 97140 MANUAL THERAPY 1/> REGIONS: CPT | Mod: GP

## 2023-09-14 RX ORDER — DOXYCYCLINE 100 MG/1
100 CAPSULE ORAL 2 TIMES DAILY
Qty: 14 CAPSULE | Refills: 0 | Status: CANCELLED | OUTPATIENT
Start: 2023-09-14 | End: 2023-09-21

## 2023-09-14 RX ORDER — CEPHALEXIN 500 MG/1
500 CAPSULE ORAL 4 TIMES DAILY
Qty: 20 CAPSULE | Refills: 0 | Status: SHIPPED | OUTPATIENT
Start: 2023-09-14 | End: 2023-09-19

## 2023-09-14 ASSESSMENT — PAIN SCALES - GENERAL: PAINLEVEL: MILD PAIN (3)

## 2023-09-14 NOTE — PROGRESS NOTES
"  Assessment & Plan     (B99.9) Infection  (primary encounter diagnosis)    Comment: Persistent inflammation/infection after injury. It does seem improved, not warm at this time. Culture obtained today, unsure if it will result in positive culture as there was not much drainage/open areas at this time.    Plan: Swab Aerobic Bacterial Culture Routine,         cephALEXin (KEFLEX) 500 MG capsule    Plan five additional days of keflex. Continue wound care. Follow up in one week if still persisting, discussed returning to [facility] as there is not availability in the main clinic. Return to [facility] if symptoms worsen or change.         Adela Harrison PA-C  [facility]   Claudia is a 73 year old, presenting for the following health issues:  Infection      HPI     Patient presents today with concerns of persistent right skin redness/swelling/scab after injury. On 9/4/23, she jabbed her skin with a garden tool. She did remove the tool. On 9/7/23, she was seen in [facility] and given a course of keflex. She states she feels like the area remains a similar size, still swollen, but less painful than last week. She does endorse some clear drainage. No pussy drainage. She has also been using warm soaks and washing the area well. She denies any fevers, chills, or sweats.      Review of Systems   Constitutional, HEENT, cardiovascular, pulmonary, gi and gu systems are negative, except as otherwise noted.      Objective    BP (!) 169/84 (BP Location: Right arm, Patient Position: Sitting, Cuff Size: Adult Large)   Pulse 65   Temp 97.8  F (36.6  C) (Temporal)   Resp 16   Ht 1.676 m (5' 6\")   Wt 96 kg (211 lb 11.2 oz)   SpO2 98%   BMI 34.17 kg/m    Body mass index is 34.17 kg/m .    Physical Exam   GENERAL: healthy, alert and no distress  RESP: no increased work of breathing  MS: Approximately three by three cm area of erythema with slight edema surrounding a closed 1 cm by 1/4 cm " scab on right shin, area on skin in slight firm but no fluctuance, no warm to the touch, no obvious drainage

## 2023-09-14 NOTE — NURSING NOTE
"Chief Complaint   Patient presents with    Infection         Initial BP (!) 171/76 (BP Location: Right arm, Patient Position: Sitting, Cuff Size: Adult Regular)   Pulse 65   Temp 97.8  F (36.6  C) (Temporal)   Resp 16   Ht 1.676 m (5' 6\")   Wt 96 kg (211 lb 11.2 oz)   SpO2 98%   BMI 34.17 kg/m   Estimated body mass index is 34.17 kg/m  as calculated from the following:    Height as of this encounter: 1.676 m (5' 6\").    Weight as of this encounter: 96 kg (211 lb 11.2 oz).     Advance Care Directive on file? no  Advance Care Directive provided to patient? no    FOOD SECURITY SCREENING QUESTIONS:    The next two questions are to help us understand your food security.  If you are feeling you need any assistance in this area, we have resources available to support you today.    Hunger Vital Signs:  Within the past 12 months we worried whether our food would run out before we got money to buy more. Never  Within the past 12 months the food we bought just didn't last and we didn't have money to get more. Never  Michelle Mayers LPN on 9/14/2023 at 9:23 AM      Michelle Mayers     "

## 2023-09-14 NOTE — PATIENT INSTRUCTIONS
Skin Infection    Keflex for five more days.  Continue warm soaks/cleaning.  Keep clean and dry.  Keep covered if draining, open otherwise.      Return in one week if there are persisting signs of infection.    Return sooner if worsening.

## 2023-09-16 LAB — BACTERIA SPEC CULT: NORMAL

## 2023-09-22 ENCOUNTER — THERAPY VISIT (OUTPATIENT)
Dept: PHYSICAL THERAPY | Facility: OTHER | Age: 73
End: 2023-09-22
Attending: PEDIATRICS
Payer: COMMERCIAL

## 2023-09-22 DIAGNOSIS — G89.29 CHRONIC PAIN OF RIGHT KNEE: Primary | ICD-10-CM

## 2023-09-22 DIAGNOSIS — M25.561 CHRONIC PAIN OF RIGHT KNEE: Primary | ICD-10-CM

## 2023-09-22 PROCEDURE — 97140 MANUAL THERAPY 1/> REGIONS: CPT | Mod: GP

## 2023-09-22 PROCEDURE — 97110 THERAPEUTIC EXERCISES: CPT | Mod: GP

## 2023-11-09 NOTE — PROGRESS NOTES
09/22/23 0500   Appointment Info   Signing clinician's name / credentials Danielle Sutherland DPT   Total/Authorized Visits 4   Visits Used 4 of 10   Medical Diagnosis Chronic R knee pain   PT Tx Diagnosis R knee pain, R hip weakness   Quick Adds Certification   Progress Note/Certification   Start of Care Date 09/01/23   Onset of illness/injury or Date of Surgery 08/01/22   Therapy Frequency 2x/week   Predicted Duration 8 weeks   Certification date from 09/01/23   Certification date to 10/27/23   Progress Note Completed Date 09/01/23   GOALS   PT Goals 2;3   PT Goal 1   Goal Identifier pain   Goal Description Pt will report a 3/10 pain or less in her R knee to increase tolerance to walking and making pottery on her spinning wheel   Goal Progress MET   Target Date 10/13/23   Date Met 09/22/23   PT Goal 2   Goal Identifier strength   Goal Description Pt will demonstrate 4/5 gross R hip ABD to increase at her knee during gait to increase tolerance to walking outdoors.   Goal Progress MET   Target Date 10/27/23   Date Met 09/22/23   PT Goal 3   Goal Identifier ROM   Goal Description Pt will demonstrate 120 deg R knee flexion to increase ROM needed for stairs and walking.   Goal Progress MET   Target Date 10/27/23   Date Met 09/22/23   Subjective Report   Subjective Report Pt reports that her knee is better and her wound is starting to heal better. Pt reports knee pain is 1/10 and is ready to discharge. pt has been back in the studio and walking across the yard and has not had an increase in pain.   PT Modalities   PT Modalities Vasopneumatic device;Ultrasound   Ultrasound   Treatment Detail US to the R medial and anterior knee at 1.0 depth, 1.0 intensity and 50% duty cycle for pain and edema management.   Patient Response/Progress good tolerance.   Vasopneumatic Device   Treatment Detail NICE for 10 min in supine with leg elevated on bolster with low compression and level 4 cold to assist with edema managment.    Treatment Interventions (PT)   Interventions Therapeutic Procedure/Exercise;Manual Therapy   Therapeutic Procedure/Exercise   Therapeutic Procedures: strength, endurance, ROM, flexibillity minutes (65605) 20   Ther Proc 1 strengthening   Ther Proc 1 - Details 30 sec: hamstring stretch, 10 reps blue band standing marching, hip ABD in seated, and LAQ green band (pt unable to tolerate blue band)   Skilled Intervention education, demonstration   Patient Response/Progress pt reports her knee is doing a lot better.   Manual Therapy   Manual Therapy: Mobilization, MFR, MLD, friction massage minutes (54719) 25   Manual Therapy 1 tissue mobilization   Manual Therapy 1 - Details STM to lateral hip, distal quad, and distal hamstring.   Patient Response/Progress good tolerance   Education   Learner/Method Patient   Plan   Home program see BRADY [id number]   Plan for next session progress HEP   Total Session Time   Timed Code Treatment Minutes 45   Total Treatment Time (sum of timed and untimed services) 45         DISCHARGE  Reason for Discharge: Patient has met all goals.    Equipment Issued: HEP    Discharge Plan: Patient to continue home program.    Referring Provider:  Leatha Lindsay

## 2024-08-30 PROCEDURE — 99284 EMERGENCY DEPT VISIT MOD MDM: CPT | Performed by: EMERGENCY MEDICINE

## 2024-08-30 PROCEDURE — 99284 EMERGENCY DEPT VISIT MOD MDM: CPT | Mod: 25 | Performed by: EMERGENCY MEDICINE

## 2024-08-31 ENCOUNTER — HOSPITAL ENCOUNTER (EMERGENCY)
Facility: OTHER | Age: 74
Discharge: HOME | End: 2024-08-31
Attending: EMERGENCY MEDICINE | Admitting: EMERGENCY MEDICINE
Payer: COMMERCIAL

## 2024-08-31 VITALS
SYSTOLIC BLOOD PRESSURE: 140 MMHG | RESPIRATION RATE: 18 BRPM | BODY MASS INDEX: 33.75 KG/M2 | HEIGHT: 66 IN | TEMPERATURE: 98.8 F | WEIGHT: 210 LBS | OXYGEN SATURATION: 99 % | DIASTOLIC BLOOD PRESSURE: 71 MMHG | HEART RATE: 89 BPM

## 2024-08-31 DIAGNOSIS — S81.852A DOG BITE OF LEFT LOWER LEG, INITIAL ENCOUNTER: ICD-10-CM
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
## 2024-08-31 DIAGNOSIS — L03.116 CELLULITIS OF LEFT LOWER LEG: ICD-10-CM

## 2024-08-31 LAB
ALBUMIN SERPL BCG-MCNC: 3.9 G/DL (ref 3.5–5.2)
ALP SERPL-CCNC: 66 U/L (ref 40–150)
ALT SERPL W P-5'-P-CCNC: 10 U/L (ref 0–50)
ANION GAP SERPL CALCULATED.3IONS-SCNC: 11 MMOL/L (ref 7–15)
AST SERPL W P-5'-P-CCNC: 13 U/L (ref 0–45)
BASOPHILS # BLD AUTO: 0.1 10E3/UL (ref 0–0.2)
BASOPHILS NFR BLD AUTO: 1 %
BILIRUB SERPL-MCNC: 0.2 MG/DL
BUN SERPL-MCNC: 18.6 MG/DL (ref 8–23)
CALCIUM SERPL-MCNC: 9.2 MG/DL (ref 8.8–10.4)
CHLORIDE SERPL-SCNC: 104 MMOL/L (ref 98–107)
CREAT SERPL-MCNC: 0.94 MG/DL (ref 0.51–0.95)
CRP SERPL-MCNC: 126.88 MG/L
EGFRCR SERPLBLD CKD-EPI 2021: 63 ML/MIN/1.73M2
EOSINOPHIL # BLD AUTO: 0.2 10E3/UL (ref 0–0.7)
EOSINOPHIL NFR BLD AUTO: 2 %
ERYTHROCYTE [DISTWIDTH] IN BLOOD BY AUTOMATED COUNT: 15.6 % (ref 10–15)
GLUCOSE SERPL-MCNC: 128 MG/DL (ref 70–99)
HCO3 SERPL-SCNC: 24 MMOL/L (ref 22–29)
HCT VFR BLD AUTO: 37.9 % (ref 35–47)
HGB BLD-MCNC: 12.4 G/DL (ref 11.7–15.7)
IMM GRANULOCYTES # BLD: 0 10E3/UL
IMM GRANULOCYTES NFR BLD: 0 %
LYMPHOCYTES # BLD AUTO: 1.1 10E3/UL (ref 0.8–5.3)
LYMPHOCYTES NFR BLD AUTO: 11 %
MCH RBC QN AUTO: 29.7 PG (ref 26.5–33)
MCHC RBC AUTO-ENTMCNC: 32.7 G/DL (ref 31.5–36.5)
MCV RBC AUTO: 91 FL (ref 78–100)
MONOCYTES # BLD AUTO: 0.8 10E3/UL (ref 0–1.3)
MONOCYTES NFR BLD AUTO: 8 %
NEUTROPHILS # BLD AUTO: 7.8 10E3/UL (ref 1.6–8.3)
NEUTROPHILS NFR BLD AUTO: 78 %
NRBC # BLD AUTO: 0 10E3/UL
NRBC BLD AUTO-RTO: 0 /100
PLATELET # BLD AUTO: 183 10E3/UL (ref 150–450)
POTASSIUM SERPL-SCNC: 4.1 MMOL/L (ref 3.4–5.3)
PROCALCITONIN SERPL IA-MCNC: 0.09 NG/ML
PROT SERPL-MCNC: 6.4 G/DL (ref 6.4–8.3)
RBC # BLD AUTO: 4.17 10E6/UL (ref 3.8–5.2)
SODIUM SERPL-SCNC: 139 MMOL/L (ref 135–145)
WBC # BLD AUTO: 10 10E3/UL (ref 4–11)

## 2024-08-31 PROCEDURE — 250N000011 HC RX IP 250 OP 636: Performed by: EMERGENCY MEDICINE

## 2024-08-31 PROCEDURE — 250N000013 HC RX MED GY IP 250 OP 250 PS 637: Performed by: EMERGENCY MEDICINE

## 2024-08-31 PROCEDURE — 86140 C-REACTIVE PROTEIN: CPT | Performed by: EMERGENCY MEDICINE

## 2024-08-31 PROCEDURE — 96372 THER/PROPH/DIAG INJ SC/IM: CPT | Performed by: EMERGENCY MEDICINE

## 2024-08-31 PROCEDURE — 90471 IMMUNIZATION ADMIN: CPT | Performed by: EMERGENCY MEDICINE

## 2024-08-31 PROCEDURE — 90715 TDAP VACCINE 7 YRS/> IM: CPT | Performed by: EMERGENCY MEDICINE

## 2024-08-31 PROCEDURE — 84145 PROCALCITONIN (PCT): CPT | Performed by: EMERGENCY MEDICINE

## 2024-08-31 PROCEDURE — 82040 ASSAY OF SERUM ALBUMIN: CPT | Performed by: EMERGENCY MEDICINE

## 2024-08-31 PROCEDURE — 36415 COLL VENOUS BLD VENIPUNCTURE: CPT | Performed by: EMERGENCY MEDICINE

## 2024-08-31 PROCEDURE — 85025 COMPLETE CBC W/AUTO DIFF WBC: CPT | Performed by: EMERGENCY MEDICINE

## 2024-08-31 RX ORDER — ACETAMINOPHEN 500 MG
1000 TABLET ORAL ONCE
Status: COMPLETED | OUTPATIENT
Start: 2024-08-31 | End: 2024-08-31

## 2024-08-31 RX ORDER — CEFTRIAXONE SODIUM 1 G
1 VIAL (EA) INJECTION ONCE
Status: COMPLETED | OUTPATIENT
Start: 2024-08-31 | End: 2024-08-31

## 2024-08-31 RX ADMIN — CLOSTRIDIUM TETANI TOXOID ANTIGEN (FORMALDEHYDE INACTIVATED), CORYNEBACTERIUM DIPHTHERIAE TOXOID ANTIGEN (FORMALDEHYDE INACTIVATED), BORDETELLA PERTUSSIS TOXOID ANTIGEN (GLUTARALDEHYDE INACTIVATED), BORDETELLA PERTUSSIS FILAMENTOUS HEMAGGLUTININ ANTIGEN (FORMALDEHYDE INACTIVATED), BORDETELLA PERTUSSIS PERTACTIN ANTIGEN, AND BORDETELLA PERTUSSIS FIMBRIAE 2/3 ANTIGEN 0.5 ML: 5; 2; 2.5; 5; 3; 5 INJECTION, SUSPENSION INTRAMUSCULAR at 00:38

## 2024-08-31 RX ADMIN — ACETAMINOPHEN 1000 MG: 500 TABLET, FILM COATED ORAL at 02:20

## 2024-08-31 RX ADMIN — CEFTRIAXONE SODIUM 1 G: 1 INJECTION, POWDER, FOR SOLUTION INTRAMUSCULAR; INTRAVENOUS at 00:38

## 2024-08-31 ASSESSMENT — ENCOUNTER SYMPTOMS
FEVER: 0
VOMITING: 0
CHEST TIGHTNESS: 0
LIGHT-HEADEDNESS: 0
CHILLS: 0
COLOR CHANGE: 1
ARTHRALGIAS: 0
WOUND: 1
AGITATION: 0
NAUSEA: 0
SHORTNESS OF BREATH: 0
DYSURIA: 0

## 2024-08-31 ASSESSMENT — ACTIVITIES OF DAILY LIVING (ADL)
ADLS_ACUITY_SCORE: 35
ADLS_ACUITY_SCORE: 35

## 2024-08-31 ASSESSMENT — COLUMBIA-SUICIDE SEVERITY RATING SCALE - C-SSRS
6. HAVE YOU EVER DONE ANYTHING, STARTED TO DO ANYTHING, OR PREPARED TO DO ANYTHING TO END YOUR LIFE?: NO
1. IN THE PAST MONTH, HAVE YOU WISHED YOU WERE DEAD OR WISHED YOU COULD GO TO SLEEP AND NOT WAKE UP?: NO
2. HAVE YOU ACTUALLY HAD ANY THOUGHTS OF KILLING YOURSELF IN THE PAST MONTH?: NO

## 2024-08-31 NOTE — ED PROVIDER NOTES
History     Chief Complaint   Patient presents with    Wound Check    Dog Bite     HPI  Claudia Ballesteros is a 74 year old female who Is here with infection in her leg.  She was bit by her dog 5 days ago.  Dog apparently had a seizure and she tried to walk by it in the [place] and it bit her in the left lower leg.  She has been keeping it clean and soaking it, she said the first 2 days it was really good and then she was a little concerned about infection but then yesterday it seemed better.  Today it is much worse and swollen and starting to drain.  Not feeling ill otherwise.    Allergies:  Allergies   Allergen Reactions    Naproxen Hives       Problem List:    Patient Active Problem List    Diagnosis Date Noted    Chronic pain of right knee 09/01/2023     Priority: Medium    Lyme disease 06/05/2018     Priority: Medium        Past Medical History:    No past medical history on file.    Past Surgical History:    No past surgical history on file.    Family History:    No family history on file.    Social History:  Marital Status:   [2]  Social History     Tobacco Use    Smoking status: Never    Smokeless tobacco: Never   Substance Use Topics    Alcohol use: Yes    Drug use: No        Medications:    amoxicillin-clavulanate (AUGMENTIN) 875-125 MG tablet  levothyroxine (SYNTHROID/LEVOTHROID) 137 MCG tablet  levothyroxine (SYNTHROID/LEVOTHROID) 150 MCG tablet  omeprazole (PRILOSEC) 20 MG DR capsule  sertraline (ZOLOFT) 50 MG tablet  simvastatin (ZOCOR) 20 MG tablet          Review of Systems   Constitutional:  Negative for chills and fever.   HENT:  Negative for congestion.    Eyes:  Negative for visual disturbance.   Respiratory:  Negative for chest tightness and shortness of breath.    Cardiovascular:  Negative for chest pain.   Gastrointestinal:  Negative for nausea and vomiting.   Genitourinary:  Negative for dysuria.   Musculoskeletal:  Negative for arthralgias.   Skin:  Positive for color change and wound.  "  Neurological:  Negative for light-headedness.   Psychiatric/Behavioral:  Negative for agitation.        Physical Exam   BP: (!) 140/71  Pulse: 89  Temp: 98.8  F (37.1  C)  Resp: 18  Height: 167.6 cm (5' 6\")  Weight: 95.3 kg (210 lb)  SpO2: 99 %      Physical Exam  Vitals and nursing note reviewed.   Constitutional:       Appearance: Normal appearance.   HENT:      Head: Normocephalic and atraumatic.      Mouth/Throat:      Mouth: Mucous membranes are moist.   Eyes:      Conjunctiva/sclera: Conjunctivae normal.   Cardiovascular:      Rate and Rhythm: Normal rate.   Pulmonary:      Effort: Pulmonary effort is normal.   Musculoskeletal:      Comments: Has puncture wounds in the posterior left lower extremity consistent with a dog bite.  This is swollen and erythematous and warm and tender.  She has a dressing over it which has some drainage there.   Skin:     General: Skin is warm and dry.   Neurological:      Mental Status: She is alert and oriented to person, place, and time.   Psychiatric:         Mood and Affect: Mood normal.         Behavior: Behavior normal.         ED Course        Procedures                Results for orders placed or performed during the hospital encounter of 08/31/24 (from the past 24 hour(s))   CBC with platelets differential    Narrative    The following orders were created for panel order CBC with platelets differential.  Procedure                               Abnormality         Status                     ---------                               -----------         ------                     CBC with platelets and d...[[id number]]  Abnormal            Final result                 Please view results for these tests on the individual orders.   Comprehensive metabolic panel   Result Value Ref Range    Sodium 139 135 - 145 mmol/L    Potassium 4.1 3.4 - 5.3 mmol/L    Carbon Dioxide (CO2) 24 22 - 29 mmol/L    Anion Gap 11 7 - 15 mmol/L    Urea Nitrogen 18.6 8.0 - 23.0 mg/dL    Creatinine 0.94 " 0.51 - 0.95 mg/dL    GFR Estimate 63 >60 mL/min/1.73m2    Calcium 9.2 8.8 - 10.4 mg/dL    Chloride 104 98 - 107 mmol/L    Glucose 128 (H) 70 - 99 mg/dL    Alkaline Phosphatase 66 40 - 150 U/L    AST 13 0 - 45 U/L    ALT 10 0 - 50 U/L    Protein Total 6.4 6.4 - 8.3 g/dL    Albumin 3.9 3.5 - 5.2 g/dL    Bilirubin Total 0.2 <=1.2 mg/dL   Procalcitonin   Result Value Ref Range    Procalcitonin 0.09 <0.50 ng/mL   CRP inflammation   Result Value Ref Range    CRP Inflammation 126.88 (H) <5.00 mg/L   CBC with platelets and differential   Result Value Ref Range    WBC Count 10.0 4.0 - 11.0 10e3/uL    RBC Count 4.17 3.80 - 5.20 10e6/uL    Hemoglobin 12.4 11.7 - 15.7 g/dL    Hematocrit 37.9 35.0 - 47.0 %    MCV 91 78 - 100 fL    MCH 29.7 26.5 - 33.0 pg    MCHC 32.7 31.5 - 36.5 g/dL    RDW 15.6 (H) 10.0 - 15.0 %    Platelet Count 183 150 - 450 10e3/uL    % Neutrophils 78 %    % Lymphocytes 11 %    % Monocytes 8 %    % Eosinophils 2 %    % Basophils 1 %    % Immature Granulocytes 0 %    NRBCs per 100 WBC 0 <1 /100    Absolute Neutrophils 7.8 1.6 - 8.3 10e3/uL    Absolute Lymphocytes 1.1 0.8 - 5.3 10e3/uL    Absolute Monocytes 0.8 0.0 - 1.3 10e3/uL    Absolute Eosinophils 0.2 0.0 - 0.7 10e3/uL    Absolute Basophils 0.1 0.0 - 0.2 10e3/uL    Absolute Immature Granulocytes 0.0 <=0.4 10e3/uL    Absolute NRBCs 0.0 10e3/uL       Medications   cefTRIAXone (ROCEPHIN) in lidocaine 1% (PF) for IM administration 1 g (1 g Intramuscular $Given 8/31/24 0038)   Tdap (tetanus-diphtheria-acell pertussis) (ADACEL) injection 0.5 mL (0.5 mLs Intramuscular $Given 8/31/24 0038)   acetaminophen (TYLENOL) tablet 1,000 mg (1,000 mg Oral $Given 8/31/24 0220)       Assessments & Plan (with Medical Decision Making)     I have reviewed the nursing notes.    I have reviewed the findings, diagnosis, plan and need for follow up with the patient.  Patient with dog bite to her lower leg 5 days ago.  Definitely appears to have cellulitis there at this time.  Labs  reveal normal white blood count, normal procalcitonin but elevated CRP.  She was given a gram of Rocephin and prescription for Augmentin.  We discussed the possibility of admission to the hospital, however they live fairly close by.  They seem quite reliable.  She would prefer to go home.  We discussed reasons to return, if it is getting significantly worse, or if she feels ill in any way with fevers chills nausea vomiting.      New Prescriptions    AMOXICILLIN-CLAVULANATE (AUGMENTIN) 875-125 MG TABLET    Take 1 tablet by mouth 2 times daily.       Final diagnoses:   Dog bite of left lower leg, initial encounter   Cellulitis of left lower leg       8/30/2024   [facility] AND [facility]       Mata Peña MD  08/31/24 6839

## 2024-08-31 NOTE — ED TRIAGE NOTES
Pt states she was bit by her own dog last Monday after dog had a seizure, Dog is up to date on shots, pt states she has been doing wound care however tonight it is looking infected and very painful. Pts last Tdap 4/2019     Triage Assessment (Adult)       Row Name 08/31/24 0000          Triage Assessment    Airway WDL WDL        Respiratory WDL    Respiratory WDL WDL        Peripheral/Neurovascular WDL    Peripheral Neurovascular WDL WDL

## 2024-09-07 ENCOUNTER — HOSPITAL ENCOUNTER (EMERGENCY)
Facility: OTHER | Age: 74
Discharge: HOME | End: 2024-09-07
Attending: FAMILY MEDICINE | Admitting: FAMILY MEDICINE
Payer: COMMERCIAL

## 2024-09-07 VITALS
HEART RATE: 80 BPM | BODY MASS INDEX: 33.32 KG/M2 | TEMPERATURE: 97.9 F | DIASTOLIC BLOOD PRESSURE: 76 MMHG | WEIGHT: 200 LBS | SYSTOLIC BLOOD PRESSURE: 152 MMHG | HEIGHT: 65 IN | RESPIRATION RATE: 16 BRPM | OXYGEN SATURATION: 98 %

## 2024-09-07 DIAGNOSIS — S81.802A WOUND OF LEFT LOWER EXTREMITY, INITIAL ENCOUNTER: Primary | ICD-10-CM

## 2024-09-07 PROCEDURE — 99283 EMERGENCY DEPT VISIT LOW MDM: CPT | Performed by: FAMILY MEDICINE

## 2024-09-07 ASSESSMENT — ACTIVITIES OF DAILY LIVING (ADL)
ADLS_ACUITY_SCORE: 33
ADLS_ACUITY_SCORE: 35
ADLS_ACUITY_SCORE: 33

## 2024-09-07 ASSESSMENT — COLUMBIA-SUICIDE SEVERITY RATING SCALE - C-SSRS
6. HAVE YOU EVER DONE ANYTHING, STARTED TO DO ANYTHING, OR PREPARED TO DO ANYTHING TO END YOUR LIFE?: NO
2. HAVE YOU ACTUALLY HAD ANY THOUGHTS OF KILLING YOURSELF IN THE PAST MONTH?: NO
1. IN THE PAST MONTH, HAVE YOU WISHED YOU WERE DEAD OR WISHED YOU COULD GO TO SLEEP AND NOT WAKE UP?: NO

## 2024-09-07 NOTE — DISCHARGE INSTRUCTIONS
Please continue to keep your wound covered. Continue to monitor for extending redness.  Continue current antibiotics.  The surgeon's office will call you Monday to get into their clinic early next week.

## 2024-09-07 NOTE — ED PROVIDER NOTES
"[facility]  Emergency Department Visit Note    Wound Check (Dog bite/)      History of Present Illness     HPI:  Claudia Ballesteros is a 74 year old female presenting with a wound on the back of her left leg.  This is a couple weeks old.  It was from a dog bite initially.  She has been in antibiotics and the redness has significantly receded.  Pain has improved.  Drainage is still present but improving.  She came in just to have her wound checked over.  She has no systemic symptoms.  She has no PCP or care team here.    Review of Systems:  10 point review of systems obtained and pertinent positive and negative findings noted in HPI. Review of systems otherwise negative.  Medications:  Reviewed in Epic Allergies:  Reviewed in Epic Problem List:  Reviewed in Epic   Past Medical History:  Reviewed in Epic Past Surgical History:  Reviewed in Epic Social History:  Reviewed in Epic       Physical Exam   Vital signs: BP (!) 150/81   Pulse 84   Temp 97.9  F (36.6  C) (Temporal)   Resp 16   Ht 1.651 m (5' 5\")   Wt 90.7 kg (200 lb)   SpO2 98%   BMI 33.28 kg/m    Physical Exam  Constitutional:       General: She is not in acute distress.     Appearance: Normal appearance. She is not diaphoretic.   HENT:      Head: Atraumatic.      Mouth/Throat:      Mouth: Mucous membranes are moist.   Eyes:      General: No scleral icterus.     Conjunctiva/sclera: Conjunctivae normal.   Cardiovascular:      Rate and Rhythm: Normal rate.      Heart sounds: Normal heart sounds.   Pulmonary:      Effort: No respiratory distress.   Skin:     Comments: Left leg wound without extending redness or warmth.  It has receded from the line that was drawn initially.  It is having some drainage. The center has some necrosis and purulence but no obvious fluctuance.   Neurological:      Mental Status: She is alert.           ED Course        Procedures                No results found for this or any previous visit (from the past 24 " hour(s)).    Medications - No data to display    Medical Decision Making     Claudia Ballesteros is a 74 year old female presenting with wound follow-up from a dog bite.  She still has 2 days of antibiotics which seem to be working well clincially.  The central part of the wound likely needs debridement and wound care.  Referral sent to general surgery and wound care for early next week.      Patient given instructions on follow-up and warning signs for which to return to ED. All questions were answered and the patient is comfortable with plan for discharge. The patient was discharged in stable condition or transferred in as stable condition as possible.    I have reviewed the patient's Medical Imaging and Medical Records.  I have reviewed the nursing notes.  I have reviewed the findings, diagnosis, plan and need for follow up with the patient.    Diagnosis & Disposition     Diagnosis:  1. Wound of left lower extremity, initial encounter        Discharge disposition:  Discharged to home       Follow-up: Follow up with primary care provider in 2-3 days       [facility]  Apple Wheeler MD  Family Medicine     Apple Wheeler MD  09/07/24 1024

## 2024-09-07 NOTE — ED TRIAGE NOTES
"Pt is here today with her  for a wound check.  Pt was seen on 08- and was treated for a dog bite.   Pt has been taking her Augmentin as prescribed but the RX is almost out.   States that the wound is improving but it is still red with active drainage.BP (!) 150/81   Pulse 84   Temp 97.9  F (36.6  C) (Temporal)   Resp 16   Ht 1.651 m (5' 5\")   Wt 90.7 kg (200 lb)   SpO2 98%   BMI 33.28 kg/m    Margarita Arias RN on 9/7/2024 at 9:57 AM         Triage Assessment (Adult)       Row Name 09/07/24 0955          Triage Assessment    Airway WDL WDL        Respiratory WDL    Respiratory WDL WDL        Skin Circulation/Temperature WDL    Skin Circulation/Temperature WDL X  Wound, left lower leg        Cardiac WDL    Cardiac WDL WDL        Peripheral/Neurovascular WDL    Peripheral Neurovascular WDL WDL        Cognitive/Neuro/Behavioral WDL    Cognitive/Neuro/Behavioral WDL WDL                     "

## 2024-09-09 DIAGNOSIS — L03.115 CELLULITIS OF RIGHT LOWER EXTREMITY: Primary | ICD-10-CM

## 2024-09-09 NOTE — TELEPHONE ENCOUNTER
Patient called requesting refill of amoxicillin-clavulanate (AUGMENTIN) 875-125 MG tablet until the next appointment scheduled for September 10,2024. Patient stated there aren't enough of the presciption to get them by until the next appointment. Patient would like a call back and would like prescription sent to [facility].  Belinda Gibson on 9/9/2024 at 9:41 AM

## 2024-09-09 NOTE — TELEPHONE ENCOUNTER
Patient was placed on a 10 day script. Will route to provider to review. Dejah Saini RN  ....................  9/9/2024   10:13 AM     23-Jan-2019 05:05

## 2024-09-10 ENCOUNTER — OFFICE VISIT (OUTPATIENT)
Dept: SURGERY | Facility: OTHER | Age: 74
End: 2024-09-10
Attending: SURGERY
Payer: COMMERCIAL

## 2024-09-10 VITALS
TEMPERATURE: 97.6 F | DIASTOLIC BLOOD PRESSURE: 78 MMHG | OXYGEN SATURATION: 97 % | WEIGHT: 210.2 LBS | RESPIRATION RATE: 18 BRPM | BODY MASS INDEX: 34.98 KG/M2 | HEART RATE: 96 BPM | SYSTOLIC BLOOD PRESSURE: 138 MMHG

## 2024-09-10 DIAGNOSIS — S81.802A WOUND OF LEFT LOWER EXTREMITY, INITIAL ENCOUNTER: ICD-10-CM

## 2024-09-10 PROCEDURE — 87205 SMEAR GRAM STAIN: CPT | Mod: ZL | Performed by: SURGERY

## 2024-09-10 PROCEDURE — G0463 HOSPITAL OUTPT CLINIC VISIT: HCPCS | Mod: 25

## 2024-09-10 PROCEDURE — 97597 DBRDMT OPN WND 1ST 20 CM/<: CPT | Performed by: SURGERY

## 2024-09-10 PROCEDURE — 99203 OFFICE O/P NEW LOW 30 MIN: CPT | Mod: 25 | Performed by: SURGERY

## 2024-09-10 ASSESSMENT — PAIN SCALES - GENERAL: PAINLEVEL: MODERATE PAIN (4)

## 2024-09-10 NOTE — NURSING NOTE
"Chief Complaint   Patient presents with    Wound Check       Medication reconciliation completed.    FOOD SECURITY SCREENING QUESTIONS:    The next two questions are to help us understand your food security.  If you are feeling you need any assistance in this area, we have resources available to support you today.    Hunger Vital Signs:  Within the past 12 months we worried whether our food would run out before we got money to buy more. Never  Within the past 12 months the food we bought just didn't last and we didn't have money to get more. Never    Initial /78 (BP Location: Right arm, Patient Position: Sitting, Cuff Size: Adult Large)   Pulse 96   Temp 97.6  F (36.4  C) (Temporal)   Resp 18   Wt 95.3 kg (210 lb 3.2 oz)   SpO2 97%   BMI 34.98 kg/m   Estimated body mass index is 34.98 kg/m  as calculated from the following:    Height as of 9/7/24: 1.651 m (5' 5\").    Weight as of this encounter: 95.3 kg (210 lb 3.2 oz).       Kavya Dewey LPN .......  9/10/2024  10:28 AM    "

## 2024-09-10 NOTE — PROGRESS NOTES
GENERAL SURGERY CONSULTATION NOTE    Claudia JOHNSON Favian   [address]  [place] [address]  74 year old  female  Admission Date/Time: No admission date for patient encounter.  Primary Care Provider:  No Ref-Primary, Physician    HPI: Dog bite while dog having seizure 8/31. In ED on 9/7 as well. Mild pain and drainage.     REVIEW OF SYSTEMS:    GENERAL: No fevers or chills. Denies fatigue, recent weight loss.  HEENT: No sinus drainage. No changes with vision or hearing. No difficulty swallowing.   LYMPHATICS:  Noswollen nodes in axilla, neck or groin.  CARDIOVASCULAR: Denies chest pain, palpitations and dyspnea on exertion.  PULMONARY: No shortness of breath or cough. No increase in sputum production.  GI: Denies melena, bright red blood in stools. No hematemesis. No constipation or diarrhea.  : No dysuria or hematuria.  SKIN: No recent rashes or ulcers.   HEMATOLOGY:  No history of easy bruising or bleeding.  ENDOCRINE:  Nohistory of diabetes or thyroid problems.  NEUROLOGY:  No history of seizures or headaches. No motor or sensory changes.    Patient Active Problem List   Diagnosis    Lyme disease    Chronic pain of right knee        No past medical history on file.    No past surgical history on file.     No family history on file.     Social History     Socioeconomic History    Marital status:      Spouse name: Not on file    Number of children: Not on file    Years of education: Not on file    Highest education level: Not on file   Occupational History    Not on file   Tobacco Use    Smoking status: Never    Smokeless tobacco: Never   Vaping Use    Vaping status: Never Used   Substance and Sexual Activity    Alcohol use: Yes    Drug use: No    Sexual activity: Not on file   Other Topics Concern    Parent/sibling w/ CABG, MI or angioplasty before 65F 55M? Not Asked   Social History Narrative    Not on file     Social Determinants of Health     Financial Resource Strain: Not on file   Food  Insecurity: Not on file   Transportation Needs: Not on file   Physical Activity: Not on file   Stress: Not on file   Social Connections: Not on file   Interpersonal Safety: Low Risk  (9/10/2024)    Interpersonal Safety     Do you feel physically and emotionally safe where you currently live?: Yes     Within the past 12 months, have you been hit, slapped, kicked or otherwise physically hurt by someone?: No     Within the past 12 months, have you been humiliated or emotionally abused in other ways by your partner or ex-partner?: No   Housing Stability: Not on file         Current Outpatient Medications   Medication Sig Dispense Refill    amoxicillin-clavulanate (AUGMENTIN) 875-125 MG tablet Take 1 tablet by mouth 2 times daily. 20 tablet 0    levothyroxine (SYNTHROID/LEVOTHROID) 150 MCG tablet Take 150 mcg by mouth daily      omeprazole (PRILOSEC) 20 MG DR capsule Take 20 mg by mouth daily      sertraline (ZOLOFT) 50 MG tablet       simvastatin (ZOCOR) 20 MG tablet        No current facility-administered medications for this visit.         ALLERGIES/SENSITIVITIES:   Allergies   Allergen Reactions    Naproxen Hives       PHYSICAL EXAM:     /78 (BP Location: Right arm, Patient Position: Sitting, Cuff Size: Adult Large)   Pulse 96   Temp 97.6  F (36.4  C) (Temporal)   Resp 18   Wt 95.3 kg (210 lb 3.2 oz)   SpO2 97%   BMI 34.98 kg/m      General Appearance:   appears well   Heart & CV:  RRR no murmur.  Intact distal pulses, good cap refill.  LUNGS:  CTA B/L, no wheezing or crackles.  Abd:  obese  Ext: Left leg shows bite to the posterior lateral calf.  Puncture wounds noted.  Erythema improved.  Eschar in center is approximately 1 cm x 1 cm.  This is sharply debrided down to subcutaneous tissues with scissors.  Culture obtained.  Undermining noted in the anterior cephalad caudad and posterior directions 1 to 1.5 cm.  Depth is 1 cm.      ADDITIONAL COMMENTS:      CONSULTATION ASSESSMENT AND PLAN:    74 year old  female with dog bite and resulting traumatic tissue injury and skin necrosis.  Culture taken.  Debridement.  Pack with quarter inch iodoform gauze daily until shallow.    Follow-up in about 2 weeks    Juan Francisco Keller MD on 9/10/2024 at 10:46 AM

## 2024-09-13 LAB
BACTERIA SPEC CULT: ABNORMAL
GRAM STAIN RESULT: ABNORMAL

## 2024-09-24 ENCOUNTER — OFFICE VISIT (OUTPATIENT)
Dept: SURGERY | Facility: OTHER | Age: 74
End: 2024-09-24
Attending: SURGERY
Payer: COMMERCIAL

## 2024-09-24 VITALS
WEIGHT: 209.4 LBS | HEART RATE: 93 BPM | SYSTOLIC BLOOD PRESSURE: 132 MMHG | TEMPERATURE: 97.8 F | HEIGHT: 66 IN | BODY MASS INDEX: 33.65 KG/M2 | DIASTOLIC BLOOD PRESSURE: 82 MMHG | OXYGEN SATURATION: 96 % | RESPIRATION RATE: 18 BRPM

## 2024-09-24 DIAGNOSIS — S81.802A WOUND OF LEFT LOWER EXTREMITY, INITIAL ENCOUNTER: Primary | ICD-10-CM

## 2024-09-24 PROCEDURE — 99212 OFFICE O/P EST SF 10 MIN: CPT | Performed by: SURGERY

## 2024-09-24 PROCEDURE — G0463 HOSPITAL OUTPT CLINIC VISIT: HCPCS

## 2024-09-24 ASSESSMENT — PAIN SCALES - GENERAL: PAINLEVEL: MILD PAIN (3)

## 2024-09-24 NOTE — PROGRESS NOTES
"GENERAL SURGERY     S:  Abx complete. Mild ache at night          PHYSICAL EXAM:     /82 (BP Location: Left arm, Patient Position: Sitting, Cuff Size: Adult Regular)   Pulse 93   Temp 97.8  F (36.6  C) (Tympanic)   Resp 18   Ht 1.676 m (5' 6\")   Wt 95 kg (209 lb 6.4 oz)   SpO2 96%   BMI 33.80 kg/m      General Appearance:   appears well   Heart & CV:  RRR no murmur.  Intact distal pulses, good cap refill.  LUNGS:  CTA B/L, no wheezing or crackles.  Abd:  obese  Ext: Left leg shows bite to the posterior lateral calf.  Puncture wounds scabbed over.   Area is 1.5 cm X 1.5 cm. Depth is 1 cm. Clean. Mild edema.       ADDITIONAL COMMENTS:      CONSULTATION ASSESSMENT AND PLAN:    74 year old female with dog bite clean with no ongoing infection.    - wet to dry until healed.     Juan Francisco Keller MD on 9/24/2024 at 2:43 PM    "

## 2024-09-24 NOTE — NURSING NOTE
"Chief Complaint   Patient presents with    Follow Up     Dog bite, left calf       Initial /82 (BP Location: Left arm, Patient Position: Sitting, Cuff Size: Adult Regular)   Pulse 93   Temp 97.8  F (36.6  C) (Tympanic)   Resp 18   Ht 1.676 m (5' 6\")   Wt 95 kg (209 lb 6.4 oz)   SpO2 96%   BMI 33.80 kg/m   Estimated body mass index is 33.8 kg/m  as calculated from the following:    Height as of this encounter: 1.676 m (5' 6\").    Weight as of this encounter: 95 kg (209 lb 6.4 oz).  Medication Review: complete    The next two questions are to help us understand your food security.  If you are feeling you need any assistance in this area, we have resources available to support you today.           No data to display                  Health Care Directive:  Patient does not have a Health Care Directive or Living Will: Patient states has Advance Directive and will bring in a copy to clinic.    Sabrina Hillman LPN      "

## 2025-06-24 DIAGNOSIS — N32.81 OVERACTIVE BLADDER: Primary | ICD-10-CM

## 2025-06-24 DIAGNOSIS — N81.89 PELVIC FLOOR WEAKNESS: ICD-10-CM

## 2025-08-29 PROBLEM — N32.81 OVERACTIVE BLADDER: Status: ACTIVE | Noted: 2025-08-29

## 2025-08-29 PROBLEM — N81.89 PELVIC FLOOR WEAKNESS: Status: ACTIVE | Noted: 2025-08-29

## 2025-09-03 ENCOUNTER — THERAPY VISIT (OUTPATIENT)
Dept: PHYSICAL THERAPY | Facility: OTHER | Age: 75
End: 2025-09-03
Attending: STUDENT IN AN ORGANIZED HEALTH CARE EDUCATION/TRAINING PROGRAM
Payer: COMMERCIAL

## 2025-09-03 DIAGNOSIS — N32.81 OVERACTIVE BLADDER: Primary | ICD-10-CM

## 2025-09-03 DIAGNOSIS — N81.89 PELVIC FLOOR WEAKNESS: ICD-10-CM

## 2025-09-03 PROCEDURE — 97112 NEUROMUSCULAR REEDUCATION: CPT | Mod: GP

## 2025-09-03 PROCEDURE — 97110 THERAPEUTIC EXERCISES: CPT | Mod: GP

## 2025-09-03 PROCEDURE — 97530 THERAPEUTIC ACTIVITIES: CPT | Mod: GP

## (undated) RX ORDER — ACETAMINOPHEN 500 MG
TABLET ORAL
Status: DISPENSED
Start: 2024-08-31

## (undated) RX ORDER — LIDOCAINE HYDROCHLORIDE 10 MG/ML
INJECTION, SOLUTION EPIDURAL; INFILTRATION; INTRACAUDAL; PERINEURAL
Status: DISPENSED
Start: 2024-08-31

## (undated) RX ORDER — CEFTRIAXONE SODIUM 1 G
VIAL (EA) INJECTION
Status: DISPENSED
Start: 2024-08-31